# Patient Record
Sex: FEMALE | Race: WHITE | Employment: FULL TIME | URBAN - METROPOLITAN AREA
[De-identification: names, ages, dates, MRNs, and addresses within clinical notes are randomized per-mention and may not be internally consistent; named-entity substitution may affect disease eponyms.]

---

## 2019-07-09 ENCOUNTER — OP HISTORICAL/CONVERTED ENCOUNTER (OUTPATIENT)
Dept: OTHER | Age: 29
End: 2019-07-09

## 2019-07-11 ENCOUNTER — IP HISTORICAL/CONVERTED ENCOUNTER (OUTPATIENT)
Dept: OTHER | Age: 29
End: 2019-07-11

## 2020-02-26 ENCOUNTER — ED HISTORICAL/CONVERTED ENCOUNTER (OUTPATIENT)
Dept: OTHER | Age: 30
End: 2020-02-26

## 2020-04-12 ENCOUNTER — ED HISTORICAL/CONVERTED ENCOUNTER (OUTPATIENT)
Dept: OTHER | Age: 30
End: 2020-04-12

## 2023-04-04 ENCOUNTER — HOSPITAL ENCOUNTER (OUTPATIENT)
Age: 33
End: 2023-04-04
Attending: EMERGENCY MEDICINE
Payer: COMMERCIAL

## 2023-04-04 LAB — DEPRECATED S PYO AG THROAT QL EIA: POSITIVE

## 2023-04-04 PROCEDURE — 99283 EMERGENCY DEPT VISIT LOW MDM: CPT

## 2023-04-04 PROCEDURE — 87880 STREP A ASSAY W/OPTIC: CPT

## 2023-04-04 PROCEDURE — 74011250637 HC RX REV CODE- 250/637: Performed by: NURSE PRACTITIONER

## 2023-04-04 RX ORDER — CLINDAMYCIN HYDROCHLORIDE 150 MG/1
300 CAPSULE ORAL
Status: COMPLETED
Start: 2023-04-04 | End: 2023-04-04

## 2023-04-04 RX ORDER — CLINDAMYCIN HYDROCHLORIDE 300 MG/1
300 CAPSULE ORAL 4 TIMES DAILY
Qty: 28 CAPSULE | Refills: 0 | Status: SHIPPED
Start: 2023-04-04 | End: 2023-04-11

## 2023-04-04 RX ADMIN — CLINDAMYCIN HYDROCHLORIDE 300 MG: 150 CAPSULE ORAL at 19:13

## 2023-04-04 NOTE — ED PROVIDER NOTES
[facility] EMERGENCY DEPARTMENT  EMERGENCY DEPARTMENT HISTORY AND PHYSICAL EXAM      Date: 2023  Patient Name: Kristian Zamorano  MRN: [id number]  YOB: 1990  Date of evaluation: 2023  Provider: Deloris Laguerre NP   Note Started: 6:36 PM 23    HISTORY OF PRESENT ILLNESS     Chief Complaint   Patient presents with    Sore Throat    Gum Problem       History Provided By: Patient    HPI: Kristian Zamorano is a 33 y.o. female past medical history significant for obesity, seasonal allergies and other history reviewed as listed below presents with onset of throat pain worsened by swallowing 4 days ago and last night started having pain to the back of her tongue and the back of her throat on the roof of her mouth just behind her rear right molar. [facility] and was told that she had allergies and to take her allergy medications but did not get tested for strep. States that over this 4-day. She has had a temperature as high as 99.8, but denies any cough, neck pain or ear pain, chest pain, shortness of breath, body aches, nausea vomiting diarrhea. She has been taking ibuprofen with some improvement of the soreness. History of strep throat which previously with similar presentation. No recent illnesses, strep infections or antibiotic use. Denies that she has any tooth or gum pain but the pain she is feeling other than her throat is on her tongue and the roof of her mouth where her tonsils used to be. She is already scheduled to see her dentist this week as well. PAST MEDICAL HISTORY   Past Medical History:  No past medical history on file. Obesity, seasonal allergies    Past Surgical History:  Past Surgical History:   Procedure Laterality Date    HX  SECTION      HX TONSILLECTOMY         Family History:  No family history on file.  No significant family history    Social History:  Social History     Tobacco Use    Smoking status: Never   Substance Use Topics    Alcohol use: No    Drug use: No       Allergies: Allergies   Allergen Reactions    Sulfa (Sulfonamide Antibiotics) Hives    Amoxicillin Rash    Pcn [Penicillins] Rash       [facility] On [facility], DO    Current Meds:   Discharge Medication List as of 4/4/2023  7:08 PM        CONTINUE these medications which have NOT CHANGED    Details   OTHER Patient takes birth control pills, Historical Med      HYDROcodone-acetaminophen (NORCO) 5-325 mg per tablet Take 1 Tab by mouth every six (6) hours as needed for Pain. Max Daily Amount: 4 Tabs., Print, Disp-15 Tab, R-0      ibuprofen (MOTRIN) 600 mg tablet Take 1 Tab by mouth every six (6) hours as needed for Pain., Print, Disp-20 Tab, R-0             PHYSICAL EXAM     ED Triage Vitals [04/04/23 1822]   ED Encounter Vitals Group      /89      Pulse (Heart Rate) 93      Resp Rate 18      Temp 98.7 °F (37.1 °C)      Temp src       O2 Sat (%) 100 %      Weight 295 lb      Height 5' 2\"      Physical Exam  Vitals and nursing note reviewed. Constitutional:       General: She is not in acute distress. Appearance: She is well-developed. She is obese. She is not ill-appearing, toxic-appearing or diaphoretic. HENT:      Head: Normocephalic. Jaw: There is normal jaw occlusion. Right Ear: Hearing, tympanic membrane, ear canal and external ear normal.      Left Ear: Hearing, tympanic membrane, ear canal and external ear normal.      Nose: No congestion or rhinorrhea. Mouth/Throat:      Lips: Pink. Mouth: Mucous membranes are moist. No oral lesions. Dentition: Normal dentition. No dental tenderness, gingival swelling, dental caries, dental abscesses or gum lesions. Tongue: Lesions present. Tongue does not deviate from midline. Palate: No mass and lesions. Pharynx: Uvula midline. Posterior oropharyngeal erythema present. No pharyngeal swelling, oropharyngeal exudate or uvula swelling.       Comments: History of tonsillectomy  Neurological: Mental Status: She is alert. SCREENINGS              LAB, EKG AND DIAGNOSTIC RESULTS   Labs:  Recent Results (from the past 12 hour(s))   STREP AG SCREEN, GROUP A    Collection Time: 04/04/23  7:00 PM    Specimen: Serum; Throat   Result Value Ref Range    Group A Strep Ag ID Positive         EKG: Not Applicable    Radiologic Studies:  Non-plain film images such as CT, Ultrasound and MRI are read by the radiologist. Plain radiographic images are visualized and preliminarily interpreted by the ED Physician with the following findings: Not Applicable    Interpretation per the Radiologist below, if available at the time of this note:  No results found. PROCEDURES   Unless otherwise noted below, none. Procedures      CRITICAL CARE TIME   Patient does not meet Critical Care Time, 0 minutes    ED COURSE and DIFFERENTIAL DIAGNOSIS/MDM   CC/HPI Summary, DDx, ED Course, and Reassessment:   Patient presents with 4 days of sore throat and development of white splotches to the back of her tongue and posterior pharynx. No dental or gingival involvement of pain or symptoms. No tongue swelling or uvula abnormality and had previously had a tonsillectomy. No cough and has been seen at another urgent care with no testing and no improvement of her symptoms after there suggested allergy medications. Differential diagnosis include pharyngitis, strep pharyngitis, postnasal drip, seasonal allergies, viral pharyngitis. No trismus, mass of palate, uvula displacement concerning for abscess or mass. No dental abnormality, gingival abnormality concerning for acute dental infection. No acute rash or overwhelming lesions in mouth concerning for yeast infection or candidiasis and has not had any antibiotics or any medications associated with development of oral thrush. Not immunocompromised or diabetic. Patient requested strep testing.   Reviewed [facility] criteria with her as far as antibiotic stewardship and indication for prophylactic or empiric treatment and given her oral lesion and tongue soreness along with length of symptoms and absence of cough patient is agreeable to and prefers to start clindamycin given her multiple allergies. First dose given in ER. Strep A Positive with culture also pending. She will also continue her allergy medications and alternate Tylenol and ibuprofen as well for symptoms management. She was given strict return precautions and will follow with her PCP and with her dentist as already scheduled. Records Reviewed (source and summary of external notes): Prior medical records and Nursing notes    Vitals:    Vitals:    04/04/23 1822   BP: 123/89   Pulse: 93   Resp: 18   Temp: 98.7 °F (37.1 °C)   SpO2: 100%   Weight: 133.8 kg (295 lb)   Height: 5' 2\" (1.575 m)          Disposition Considerations (Tests not done, Shared Decision Making, Pt Expectation of Test or Treatment.): Not Applicable    Patient was given the following medications:  Medications   clindamycin (CLEOCIN) capsule 300 mg (300 mg Oral Given 4/4/23 1913)       CONSULTS: (Who and What was discussed)  None     Social Determinants affecting Dx or Tx: None    Smoking Cessation: Not Applicable    FINAL IMPRESSION     1. Acute streptococcal pharyngitis    2. Tongue lesion          DISPOSITION/PLAN   Discharged    Discharge Note: The patient is stable for discharge home. The signs, symptoms, diagnosis, and discharge instructions have been discussed, understanding conveyed, and agreed upon. The patient is to follow up as recommended or return to ER should their symptoms worsen.       PATIENT REFERRED TO:  Follow-up Information       Follow up With Specialties Details Why Contact Info    Your dentist    Please keep your appointment as scheduled    Follow up with primary care, urgent care, or this Emergency department   As needed, If symptoms worsen               DISCHARGE MEDICATIONS:  Discharge Medication List as of 4/4/2023  7:08 PM START taking these medications    Details   clindamycin (CLEOCIN) 300 mg capsule Take 1 Capsule by mouth four (4) times daily for 7 days. , Normal, Disp-28 Capsule, R-0           CONTINUE these medications which have NOT CHANGED    Details   OTHER Patient takes birth control pills, Historical Med      HYDROcodone-acetaminophen (NORCO) 5-325 mg per tablet Take 1 Tab by mouth every six (6) hours as needed for Pain. Max Daily Amount: 4 Tabs., Print, Disp-15 Tab, R-0      ibuprofen (MOTRIN) 600 mg tablet Take 1 Tab by mouth every six (6) hours as needed for Pain., Print, Disp-20 Tab, R-0               DISCONTINUED MEDICATIONS:  Discharge Medication List as of 4/4/2023  7:08 PM          I am the Primary Clinician of Record: Mac Shah NP (electronically signed)    (Please note that parts of this dictation were completed with voice recognition software. Quite often unanticipated grammatical, syntax, homophones, and other interpretive errors are inadvertently transcribed by the computer software. Please disregards these errors.  Please excuse any errors that have escaped final proofreading.)